# Patient Record
Sex: FEMALE | Race: WHITE | Employment: FULL TIME | URBAN - METROPOLITAN AREA
[De-identification: names, ages, dates, MRNs, and addresses within clinical notes are randomized per-mention and may not be internally consistent; named-entity substitution may affect disease eponyms.]

---

## 2020-01-22 ENCOUNTER — HOSPITAL ENCOUNTER (EMERGENCY)
Age: 35
Discharge: HOME | End: 2020-01-22
Attending: EMERGENCY MEDICINE

## 2020-01-22 VITALS
HEIGHT: 62 IN | OXYGEN SATURATION: 99 % | HEART RATE: 63 BPM | WEIGHT: 130 LBS | BODY MASS INDEX: 23.92 KG/M2 | DIASTOLIC BLOOD PRESSURE: 64 MMHG | SYSTOLIC BLOOD PRESSURE: 104 MMHG | RESPIRATION RATE: 16 BRPM | TEMPERATURE: 98.1 F

## 2020-01-22 PROCEDURE — 99282 EMERGENCY DEPT VISIT SF MDM: CPT

## 2020-01-22 RX ORDER — CEPHALEXIN 500 MG/1
500 CAPSULE ORAL 3 TIMES DAILY
Qty: 21 CAPSULE | Refills: 0 | Status: SHIPPED | OUTPATIENT
Start: 2020-01-22 | End: 2020-01-29

## 2020-01-22 RX ORDER — TETRACAINE HYDROCHLORIDE 5 MG/ML
1 SOLUTION OPHTHALMIC ONCE
Status: DISCONTINUED | OUTPATIENT
Start: 2020-01-22 | End: 2020-01-22 | Stop reason: HOSPADM

## 2020-01-22 RX ORDER — ERYTHROMYCIN 5 MG/G
OINTMENT OPHTHALMIC
Qty: 1 TUBE | Refills: 0 | Status: SHIPPED | OUTPATIENT
Start: 2020-01-22 | End: 2020-02-01

## 2020-01-22 ASSESSMENT — PAIN DESCRIPTION - PAIN TYPE: TYPE: ACUTE PAIN

## 2020-01-22 ASSESSMENT — VISUAL ACUITY
OS: 20/20
OD: 20/20
OU: 20/20

## 2020-01-22 ASSESSMENT — PAIN SCALES - GENERAL: PAINLEVEL_OUTOF10: 2

## 2020-01-22 ASSESSMENT — PAIN DESCRIPTION - LOCATION: LOCATION: EYE

## 2020-01-22 NOTE — LETTER
[facility] Emergency Department  [address] [address]  Phone: [phone]  Fax: [phone]               January 22, 2020    Patient: Vashti Nugent   YOB: 1985   Date of Visit: 1/22/2020       To Whom It May Concern:    Vashti Nugent was seen and treated in our emergency department on 1/22/2020. She may return to work on 1/23/20 but may need to leave to see an eye doctor for follow up on either Thursday or Friday this week. .      Sincerely,       Leelee Barber MD         Signature:__________________________________

## 2020-01-22 NOTE — ED PROVIDER NOTES
Rhythm: Normal rate and regular rhythm. Pulmonary:      Effort: Pulmonary effort is normal.   Skin:     General: Skin is warm and dry. Capillary Refill: Capillary refill takes less than 2 seconds. Neurological:      General: No focal deficit present. Mental Status: She is alert and oriented to person, place, and time. Diagnostic Results     EKG   none    RADIOLOGY:  No orders to display       LABS:   No results found for this visit on 01/22/20. ED BEDSIDE ULTRASOUND:  none    RECENT VITALS:  BP: 104/64,Temp: 98.1 °F (36.7 °C), Pulse: 63, Resp: 16, SpO2: 99 %     Procedures     none    ED Course     Nursing Notes, Past Medical Hx, Past Surgical Hx, Social Hx,Allergies, and Family Hx were reviewed. patient was given the following medications:  Orders Placed This Encounter   Medications    tetracaine (TETRAVISC) 0.5 % ophthalmic solution 1 drop    cephALEXin (KEFLEX) 500 MG capsule     Sig: Take 1 capsule by mouth 3 times daily for 7 days     Dispense:  21 capsule     Refill:  0    erythromycin (ROMYCIN) 5 MG/GM ophthalmic ointment     Sig: Use a small amount one daily at bedtime x 7 days. Dispense:  1 Tube     Refill:  0       CONSULTS:  None    MEDICAL DECISIONMAKING / ASSESSMENT / Kiana Sink is a 27 y.o. female who presents with CC of eye swelling. Initial exam reveals a well-appearing female in no acute distress with normal vitals, afebrile. Physical exam remarkable for right upper eyelid swelling and erythema consistent with likely mild periorbital cellulitis. No red flags to suggest orbital cellulitis. Visual acuity normal, only slight conjunctival injection. Small stye noted underneath right upper eyelid. This is likely the source of swelling and pain. Patient will be discharged with erythromycin ointment to prevent any scratching or corneal damage, as well as antibiotics for periorbital cellulitis.   Patient can follow-up with outpatient eye doctor. .      Clinical Impression     1. Hordeolum internum of right upper eyelid    2. Periorbital cellulitis of right eye        Disposition     PATIENT REFERRED TO:  No follow-up provider specified. DISCHARGE MEDICATIONS:  New Prescriptions    CEPHALEXIN (KEFLEX) 500 MG CAPSULE    Take 1 capsule by mouth 3 times daily for 7 days    ERYTHROMYCIN (ROMYCIN) 5 MG/GM OPHTHALMIC OINTMENT    Use a small amount one daily at bedtime x 7 days.        DISPOSITION  - discharge home       Bety Jesus MD  01/22/20 2140

## 2020-03-20 ENCOUNTER — HOSPITAL ENCOUNTER (EMERGENCY)
Age: 35
Discharge: HOME | End: 2020-03-20
Attending: EMERGENCY MEDICINE

## 2020-03-20 VITALS
WEIGHT: 130 LBS | DIASTOLIC BLOOD PRESSURE: 69 MMHG | BODY MASS INDEX: 23.78 KG/M2 | HEART RATE: 88 BPM | RESPIRATION RATE: 18 BRPM | SYSTOLIC BLOOD PRESSURE: 119 MMHG | TEMPERATURE: 98.5 F | OXYGEN SATURATION: 98 %

## 2020-03-20 LAB
GLUCOSE BLD-MCNC: 141 MG/DL (ref 70–99)
PERFORMED ON: ABNORMAL

## 2020-03-20 PROCEDURE — 6360000002 HC RX W HCPCS: Performed by: STUDENT IN AN ORGANIZED HEALTH CARE EDUCATION/TRAINING PROGRAM

## 2020-03-20 PROCEDURE — 99283 EMERGENCY DEPT VISIT LOW MDM: CPT

## 2020-03-20 PROCEDURE — 2580000003 HC RX 258: Performed by: STUDENT IN AN ORGANIZED HEALTH CARE EDUCATION/TRAINING PROGRAM

## 2020-03-20 PROCEDURE — 6360000002 HC RX W HCPCS

## 2020-03-20 PROCEDURE — 96374 THER/PROPH/DIAG INJ IV PUSH: CPT

## 2020-03-20 PROCEDURE — 96375 TX/PRO/DX INJ NEW DRUG ADDON: CPT

## 2020-03-20 RX ORDER — DIPHENHYDRAMINE HCL 25 MG
25 TABLET ORAL ONCE
Status: DISCONTINUED | OUTPATIENT
Start: 2020-03-20 | End: 2020-03-20

## 2020-03-20 RX ORDER — DIPHENHYDRAMINE HYDROCHLORIDE 50 MG/ML
INJECTION INTRAMUSCULAR; INTRAVENOUS
Status: COMPLETED
Start: 2020-03-20 | End: 2020-03-20

## 2020-03-20 RX ORDER — DIPHENHYDRAMINE HYDROCHLORIDE 50 MG/ML
25 INJECTION INTRAMUSCULAR; INTRAVENOUS ONCE
Status: COMPLETED | OUTPATIENT
Start: 2020-03-20 | End: 2020-03-20

## 2020-03-20 RX ORDER — PROCHLORPERAZINE EDISYLATE 5 MG/ML
10 INJECTION INTRAMUSCULAR; INTRAVENOUS ONCE
Status: COMPLETED | OUTPATIENT
Start: 2020-03-20 | End: 2020-03-20

## 2020-03-20 RX ORDER — KETOROLAC TROMETHAMINE 30 MG/ML
30 INJECTION, SOLUTION INTRAMUSCULAR; INTRAVENOUS ONCE
Status: COMPLETED | OUTPATIENT
Start: 2020-03-20 | End: 2020-03-20

## 2020-03-20 RX ORDER — PROCHLORPERAZINE MALEATE 5 MG/1
5 TABLET ORAL EVERY 6 HOURS PRN
Qty: 120 TABLET | Refills: 3 | Status: SHIPPED | OUTPATIENT
Start: 2020-03-20

## 2020-03-20 RX ORDER — 0.9 % SODIUM CHLORIDE 0.9 %
500 INTRAVENOUS SOLUTION INTRAVENOUS ONCE
Status: COMPLETED | OUTPATIENT
Start: 2020-03-20 | End: 2020-03-20

## 2020-03-20 RX ADMIN — PROCHLORPERAZINE EDISYLATE 10 MG: 5 INJECTION INTRAMUSCULAR; INTRAVENOUS at 21:45

## 2020-03-20 RX ADMIN — SODIUM CHLORIDE 500 ML: 9 INJECTION, SOLUTION INTRAVENOUS at 21:45

## 2020-03-20 RX ADMIN — DIPHENHYDRAMINE HYDROCHLORIDE 25 MG: 50 INJECTION INTRAMUSCULAR; INTRAVENOUS at 21:45

## 2020-03-20 RX ADMIN — DIPHENHYDRAMINE HYDROCHLORIDE 25 MG: 50 INJECTION, SOLUTION INTRAMUSCULAR; INTRAVENOUS at 21:45

## 2020-03-20 RX ADMIN — KETOROLAC TROMETHAMINE 30 MG: 30 INJECTION, SOLUTION INTRAMUSCULAR at 21:45

## 2020-03-20 ASSESSMENT — PAIN DESCRIPTION - PAIN TYPE: TYPE: ACUTE PAIN;CHRONIC PAIN

## 2020-03-20 ASSESSMENT — PAIN SCALES - GENERAL
PAINLEVEL_OUTOF10: 5
PAINLEVEL_OUTOF10: 5

## 2020-03-20 ASSESSMENT — PAIN DESCRIPTION - DESCRIPTORS: DESCRIPTORS: DISCOMFORT;HEADACHE

## 2020-03-20 ASSESSMENT — PAIN DESCRIPTION - LOCATION: LOCATION: HEAD;NECK

## 2020-03-20 ASSESSMENT — PAIN DESCRIPTION - ONSET: ONSET: ON-GOING

## 2020-03-20 ASSESSMENT — PAIN DESCRIPTION - FREQUENCY: FREQUENCY: CONTINUOUS

## 2020-03-20 ASSESSMENT — PAIN DESCRIPTION - ORIENTATION: ORIENTATION: LEFT;ANTERIOR;POSTERIOR

## 2020-03-21 NOTE — ED PROVIDER NOTES
malaise   Heme/Lymph: Does not bruise or bleed easily  Psych: No confusion, anxiety, depression      Past Medical, Surgical, Family, and Social History     She has a past medical history of Migraines, Periorbital cellulitis of right eye, Spider angioma of skin, and TMJ dysfunction. She has no past surgical history on file. Her family history is not on file. She reports that she has quit smoking. She has never used smokeless tobacco. She reports that she does not drink alcohol or use drugs. Medications     Previous Medications    ASPIRIN-ACETAMINOPHEN-CAFFEINE (EXCEDRIN MIGRAINE) 250-250-65 MG PER TABLET    Take 1 tablet by mouth every 6 hours as needed for Headaches       Allergies     She has No Known Allergies. Physical Exam     INITIAL VITALS: BP: 138/84, Temp: 98.5 °F (36.9 °C), Pulse: 88, Resp: 18, SpO2: 100 %     Physical Exam:  Const: Well nourished, not diaphoretic  Eyes: PERRL, EOMI  HENT: Normocephalic, atraumatic  Neck: No JVD; neck stiffness, but says it is because it makes her headache worse  CV: RRR, no murmurs, 2+ pulses peripherally  RESP: CTAB, no wheezing, rhonchi, rales  GI: S/NT/ND  MSK/Extremities: No edema, no tenderness to palpation  Skin: Warm, dry. No rashes, no erythema  Neuro: AAOx3. Sensation and motor function of extremities grossly intact  Psych: Appropriate mood and affect. Diagnostic Results       RADIOLOGY:  No orders to display       LABS:   No results found for this visit on 03/20/20. RECENT VITALS:  BP: 138/84, Temp: 98.5 °F (36.9 °C),Pulse: 88, Resp: 18, SpO2: 100 %     ED Course     Nursing Notes, Past Medical Hx, Past Surgical Hx, Social Hx, Allergies, and FamilyHx were reviewed.     The patient was giventhe following medications:  Orders Placed This Encounter   Medications    prochlorperazine (COMPAZINE) injection 10 mg    ketorolac (TORADOL) injection 30 mg    DISCONTD: diphenhydrAMINE (BENADRYL) tablet 25 mg    0.9 % sodium chloride bolus    diphenhydrAMINE (BENADRYL) injection 25 mg    diphenhydrAMINE (BENADRYL) 50 MG/ML injection     FELICITAS LOPEZ: cabinet overrstefano    prochlorperazine (COMPAZINE) 5 MG tablet     Sig: Take 1 tablet by mouth every 6 hours as needed for Nausea     Dispense:  120 tablet     Refill:  3       CONSULTS:  None    MEDICAL DECISION MAKING / ASSESSMENT / Nelida Areas is a 38 y.o. female who presents with migraine for 5 days. Patient states her symptoms started on Sunday and have not abated like they usually do when she takes her Excedrin. Says that her bifrontal headache is worse than usual and she notes some neck stiffness. States that she had a temperature of 100.1F on Tuesday, but has had no fevers at home since then. Otherwise, notes generalized nausea, fatigue, weakness, and lightheadedness. Patient's vitals grossly unremarkable. Patient is a well-appearing young  female who does not appear to be in discomfort. Cardiac, pulmonary, and abdominal exams are all grossly unremarkable. Neurologic examination reveals no abnormalities on cranial nerve exam.  Motor exam in extremities grossly intact as is sensory exam.  Patient notes some mild neck stiffness with neck flexion, but not as much on extension. Twisting her neck from side to side makes the headache worse. No tenderness in occipital muscles, and negative Kernig and Brudzinski's sign. Due to patient's clinical presentation and history, low suspicion for serious pathology like meningitis or pseudotumor cerebri. Patient is afebrile and neck stiffness seems to be musculoskeletal and related to migraine headache that actually sound more like tension headaches with her HPI. Will treat symptomatically with compazine, toradol, and benadryl, along with a 500cc bolus of normal saline. Patient stated that symptomatic treatment significantly improved her symptoms.   Informed patient that due to the length of her symptoms and the vague fatigue, muscle aches, and general feeling of being unwell that she likely has a viral prodrome that is exacerbating her chronic headache. Informed patient that there was no indication for imaging or invasive testing like LP. Advised patient that she should continue to treat herself at home with rest, hydration, and nutrition, in addition to NSAIDs and tylenol for her headache if it persists. Also counseled patient that with her symptoms that she may very well have COVID-19 and should self-quarantine until her symptoms resolve. Advised patient that if her symptoms failed to resolve in a timely fashion that she should call her primary care physician to discuss possibility of further testing. In the meantime, advised to stay home. 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 Strongly advised patient that she should self quarantine due to the fact that there is not widespread testing in [place] for coronavirus due to a paucity of tests. Advised patient to inform staff that only patients with acute respiratory illness or being admitted to the hospital in high risk medical professionals are currently being tested. Also provided patient with a work note stating these recommendations. Patient verbalized understanding of the treatment plan, but was unsure whether or not she would be able to comply with the quarantine recommendations due to work restrictions. Otherwise, expressed agreement with the proposed plan of action. This patient was also evaluated by the attending physician. All care plans were discussed and agreed upon. Clinical Impression     1. Chronic migraine w/o aura w/o status migrainosus, not intractable    2.  Viral illness        Disposition     PATIENT REFERRED Markell Cordova DO  [address] [facility]  [phone]    Call   As needed, If symptoms worsen      DISCHARGE MEDICATIONS:  New Prescriptions    PROCHLORPERAZINE (COMPAZINE) 5 MG TABLET    Take 1 tablet by mouth every 6 hours as needed for Nausea       DISPOSITION    Decision to discharge    This note was composed using [employer] dictation software. Efforts were made to ensure correct and accurate translation, but errors in dictation may be present.         Amandeep Bean MD  Resident  03/20/20 9269